# Patient Record
Sex: FEMALE | Race: WHITE | NOT HISPANIC OR LATINO
[De-identification: names, ages, dates, MRNs, and addresses within clinical notes are randomized per-mention and may not be internally consistent; named-entity substitution may affect disease eponyms.]

---

## 2018-10-14 ENCOUNTER — TRANSCRIPTION ENCOUNTER (OUTPATIENT)
Age: 58
End: 2018-10-14

## 2020-07-05 ENCOUNTER — TRANSCRIPTION ENCOUNTER (OUTPATIENT)
Age: 60
End: 2020-07-05

## 2022-02-25 ENCOUNTER — APPOINTMENT (OUTPATIENT)
Dept: GASTROENTEROLOGY | Facility: CLINIC | Age: 62
End: 2022-02-25
Payer: COMMERCIAL

## 2022-02-25 DIAGNOSIS — T17.308A UNSPECIFIED FOREIGN BODY IN LARYNX CAUSING OTHER INJURY, INITIAL ENCOUNTER: ICD-10-CM

## 2022-02-25 DIAGNOSIS — Z78.9 OTHER SPECIFIED HEALTH STATUS: ICD-10-CM

## 2022-02-25 DIAGNOSIS — Z12.11 ENCOUNTER FOR SCREENING FOR MALIGNANT NEOPLASM OF COLON: ICD-10-CM

## 2022-02-25 DIAGNOSIS — I10 ESSENTIAL (PRIMARY) HYPERTENSION: ICD-10-CM

## 2022-02-25 DIAGNOSIS — E78.00 PURE HYPERCHOLESTEROLEMIA, UNSPECIFIED: ICD-10-CM

## 2022-02-25 DIAGNOSIS — Z12.12 ENCOUNTER FOR SCREENING FOR MALIGNANT NEOPLASM OF COLON: ICD-10-CM

## 2022-02-25 DIAGNOSIS — M19.90 UNSPECIFIED OSTEOARTHRITIS, UNSPECIFIED SITE: ICD-10-CM

## 2022-02-25 DIAGNOSIS — Z83.79 FAMILY HISTORY OF OTHER DISEASES OF THE DIGESTIVE SYSTEM: ICD-10-CM

## 2022-02-25 PROCEDURE — 99204 OFFICE O/P NEW MOD 45 MIN: CPT

## 2022-02-25 RX ORDER — ATORVASTATIN CALCIUM 10 MG/1
10 TABLET, FILM COATED ORAL
Refills: 0 | Status: ACTIVE | COMMUNITY

## 2022-02-25 RX ORDER — UBIDECARENONE 50 MG
CAPSULE ORAL
Refills: 0 | Status: ACTIVE | COMMUNITY

## 2022-02-25 RX ORDER — METFORMIN HYDROCHLORIDE 500 MG/1
500 TABLET, COATED ORAL
Refills: 0 | Status: ACTIVE | COMMUNITY

## 2022-02-25 RX ORDER — CYCLOSPORINE 0.5 MG/ML
0.05 EMULSION OPHTHALMIC
Refills: 0 | Status: ACTIVE | COMMUNITY

## 2022-02-25 RX ORDER — POLYETHYLENE GLYCOL 3350, SODIUM CHLORIDE, SODIUM BICARBONATE AND POTASSIUM CHLORIDE WITH LEMON FLAVOR 420; 11.2; 5.72; 1.48 G/4L; G/4L; G/4L; G/4L
420 POWDER, FOR SOLUTION ORAL
Qty: 1 | Refills: 0 | Status: ACTIVE | COMMUNITY
Start: 2022-02-25 | End: 1900-01-01

## 2022-02-25 RX ORDER — LEVOCETIRIZINE DIHYDROCHLORIDE 5 MG/1
5 TABLET ORAL
Refills: 0 | Status: ACTIVE | COMMUNITY

## 2022-02-25 RX ORDER — METOPROLOL SUCCINATE 50 MG/1
50 TABLET, EXTENDED RELEASE ORAL
Refills: 0 | Status: ACTIVE | COMMUNITY

## 2022-02-25 RX ORDER — LISINOPRIL 20 MG/1
20 TABLET ORAL
Refills: 0 | Status: ACTIVE | COMMUNITY

## 2022-02-25 RX ORDER — FESOTERODINE FUMARATE 8 MG/1
TABLET, FILM COATED, EXTENDED RELEASE ORAL
Refills: 0 | Status: ACTIVE | COMMUNITY

## 2022-02-25 RX ORDER — MONTELUKAST 10 MG/1
10 TABLET, FILM COATED ORAL
Refills: 0 | Status: ACTIVE | COMMUNITY

## 2022-02-25 NOTE — HISTORY OF PRESENT ILLNESS
[de-identified] : Kristofer Lopez DO\par [facility]\par [address]\par [address]\[place] [address]\par \par [phone]\par \par Feb 25, 2022 \par \par KRISTOFER LOPEZ MD\par \par Pleasant 61-year-old female\par \par Having vague sense of dysphagia, choking, both to solids and liquids, sporadically, intermittently, but she thinks more often lately\par \par No heartburn or indigestion\par \par No weight loss\par \par No anorexia, nausea, vomiting or weight loss\par \par Ms. BRAYAN MCGREGOR 61 year is referred for colon cancer screening.  The patient denies any change in bowel movements, blood per rectum, abdominal, pelvic or rectal discomfort.   \par \par There is no family history of colon cancer or other gastrointestinal cancers.\par \par The patient denies any unexplained weight loss, fever chills or night sweats.\par \par This is the first screening colonoscopy for the patient.\par \par There is no significant cardiac or pulmonary history.\par \par No complaints of chest pain, shortness of breath, palpitations, cough.\par \par The patient is feeling quite well.\par \par The patient is on no significant anticoagulant therapy or anti platelet therapy. \par \par No adverse reaction to anesthesia in the past.\par \par

## 2022-02-25 NOTE — REASON FOR VISIT
[Initial Evaluation] : an initial evaluation [FreeTextEntry1] : Vague sense of difficulty swallowing both solids and liquids with choking sporadically and intermittently but more frequently lately, and also colon cancer screening

## 2022-02-25 NOTE — ASSESSMENT
[FreeTextEntry1] : Impression\par \par Vague sense of dysphagia to both solids and liquids with choking intermittently, sporadically, though more frequent\par \par No heartburn or indigestion\par \par Needs screening colonoscopy, first exam\par \par Suggest\par \par Barium esophagram\par \par Both upper endoscopy and colonoscopy\par \par Nulytely\par \par Risks/benefits:\par The procedure, the risks and benefits and alternatives have been reviewed in great detail with the patient.  Risks including, but not limited to sedation such as cardiac and pulmonary compromise, the procedure itself such as bleeding requiring hospitalization, transfusion, surgery, temporary or permanent colostomy.  Perforation or puncture of the requiring hospitalization, surgery, temporary colostomy.\par It has been explained to the patient that though colonoscopy is thought to be the best screening exam for colon cancer and polyps, no screening exam can find all colon polyps or cancers.  \par The patient expresses understanding of the procedure and consents to undergoing the procedure.\par \par

## 2022-02-25 NOTE — CONSULT LETTER
[Dear  ___] : Dear  [unfilled], [Consult Letter:] : I had the pleasure of evaluating your patient, [unfilled]. [Please see my note below.] : Please see my note below. [Consult Closing:] : Thank you very much for allowing me to participate in the care of this patient.  If you have any questions, please do not hesitate to contact me. [Sincerely,] : Sincerely, [FreeTextEntry2] : Joaquim Cervantes, DO\par [facility]\par [address]\par [address]\[place] [address] [FreeTextEntry3] : Barry Mcgraw MD\par

## 2022-02-25 NOTE — PHYSICAL EXAM
[General Appearance - Alert] : alert [General Appearance - In No Acute Distress] : in no acute distress [General Appearance - Well Nourished] : well nourished [General Appearance - Well Developed] : well developed [General Appearance - Well-Appearing] : healthy appearing [Sclera] : the sclera and conjunctiva were normal [Neck Appearance] : the appearance of the neck was normal [Neck Cervical Mass (___cm)] : no neck mass was observed [Jugular Venous Distention Increased] : there was no jugular-venous distention [] : no respiratory distress [Auscultation Breath Sounds / Voice Sounds] : lungs were clear to auscultation bilaterally [Apical Impulse] : the apical impulse was normal [Full Pulse] : the pedal pulses are present [Edema] : there was no peripheral edema [Cervical Lymph Nodes Enlarged Posterior Bilaterally] : posterior cervical [Cervical Lymph Nodes Enlarged Anterior Bilaterally] : anterior cervical [Supraclavicular Lymph Nodes Enlarged Bilaterally] : supraclavicular [Axillary Lymph Nodes Enlarged Bilaterally] : axillary [Femoral Lymph Nodes Enlarged Bilaterally] : femoral [Inguinal Lymph Nodes Enlarged Bilaterally] : inguinal [No CVA Tenderness] : no ~M costovertebral angle tenderness [No Spinal Tenderness] : no spinal tenderness [Abnormal Walk] : normal gait [Nail Clubbing] : no clubbing  or cyanosis of the fingernails [Musculoskeletal - Swelling] : no joint swelling seen [Motor Tone] : muscle strength and tone were normal [Skin Color & Pigmentation] : normal skin color and pigmentation [Skin Turgor] : normal skin turgor [No Focal Deficits] : no focal deficits [Oriented To Time, Place, And Person] : oriented to person, place, and time [Impaired Insight] : insight and judgment were intact [Affect] : the affect was normal

## 2022-04-12 ENCOUNTER — NON-APPOINTMENT (OUTPATIENT)
Age: 62
End: 2022-04-12

## 2022-04-16 LAB — SARS-COV-2 N GENE NPH QL NAA+PROBE: NOT DETECTED

## 2022-04-19 ENCOUNTER — APPOINTMENT (OUTPATIENT)
Dept: GASTROENTEROLOGY | Facility: HOSPITAL | Age: 62
End: 2022-04-19

## 2022-04-21 DIAGNOSIS — Z11.52 ENCOUNTER FOR SCREENING FOR COVID-19: ICD-10-CM

## 2022-04-21 DIAGNOSIS — Z01.818 ENCOUNTER FOR OTHER PREPROCEDURAL EXAMINATION: ICD-10-CM

## 2022-05-22 LAB — SARS-COV-2 N GENE NPH QL NAA+PROBE: NOT DETECTED

## 2022-05-23 ENCOUNTER — APPOINTMENT (OUTPATIENT)
Dept: GASTROENTEROLOGY | Facility: AMBULATORY MEDICAL SERVICES | Age: 62
End: 2022-05-23
Payer: COMMERCIAL

## 2022-05-23 DIAGNOSIS — R13.10 DYSPHAGIA, UNSPECIFIED: ICD-10-CM

## 2022-05-23 PROCEDURE — 45385 COLONOSCOPY W/LESION REMOVAL: CPT | Mod: 33

## 2022-05-23 PROCEDURE — 45382 COLONOSCOPY W/CONTROL BLEED: CPT | Mod: 33,59

## 2022-05-23 PROCEDURE — 43239 EGD BIOPSY SINGLE/MULTIPLE: CPT | Mod: 59

## 2022-05-23 PROCEDURE — 45380 COLONOSCOPY AND BIOPSY: CPT | Mod: 33,59

## 2022-05-23 RX ORDER — FAMOTIDINE 40 MG/1
40 TABLET, FILM COATED ORAL
Qty: 30 | Refills: 2 | Status: ACTIVE | COMMUNITY
Start: 2022-05-23 | End: 1900-01-01

## 2022-06-08 ENCOUNTER — NON-APPOINTMENT (OUTPATIENT)
Age: 62
End: 2022-06-08

## 2024-12-16 ENCOUNTER — NON-APPOINTMENT (OUTPATIENT)
Age: 64
End: 2024-12-16

## 2024-12-20 ENCOUNTER — APPOINTMENT (OUTPATIENT)
Dept: ORTHOPEDIC SURGERY | Facility: CLINIC | Age: 64
End: 2024-12-20
Payer: MEDICARE

## 2024-12-20 VITALS — BODY MASS INDEX: 31.39 KG/M2 | HEIGHT: 67 IN | WEIGHT: 200 LBS

## 2024-12-20 DIAGNOSIS — S32.009A UNSPECIFIED FRACTURE OF UNSPECIFIED LUMBAR VERTEBRA, INITIAL ENCOUNTER FOR CLOSED FRACTURE: ICD-10-CM

## 2024-12-20 DIAGNOSIS — S32.030A WEDGE COMPRESSION FRACTURE OF THIRD LUMBAR VERTEBRA, INITIAL ENCOUNTER FOR CLOSED FRACTURE: ICD-10-CM

## 2024-12-20 PROCEDURE — 99203 OFFICE O/P NEW LOW 30 MIN: CPT

## 2025-02-25 ENCOUNTER — NON-APPOINTMENT (OUTPATIENT)
Age: 65
End: 2025-02-25

## 2025-09-09 ENCOUNTER — NON-APPOINTMENT (OUTPATIENT)
Age: 65
End: 2025-09-09

## 2025-09-16 ENCOUNTER — NON-APPOINTMENT (OUTPATIENT)
Age: 65
End: 2025-09-16